# Patient Record
Sex: MALE | Race: WHITE | NOT HISPANIC OR LATINO | URBAN - METROPOLITAN AREA
[De-identification: names, ages, dates, MRNs, and addresses within clinical notes are randomized per-mention and may not be internally consistent; named-entity substitution may affect disease eponyms.]

---

## 2017-03-13 ENCOUNTER — FOLLOW UP ESTABLISHED (OUTPATIENT)
Dept: URBAN - METROPOLITAN AREA CLINIC 30 | Facility: CLINIC | Age: 72
End: 2017-03-13
Payer: COMMERCIAL

## 2017-03-13 DIAGNOSIS — H25.813 COMBINED FORMS OF AGE-RELATED CATARACT, BILATERAL: Primary | ICD-10-CM

## 2017-03-13 PROCEDURE — 92014 COMPRE OPH EXAM EST PT 1/>: CPT | Performed by: OPTOMETRIST

## 2017-03-13 PROCEDURE — 92015 DETERMINE REFRACTIVE STATE: CPT | Performed by: OPTOMETRIST

## 2017-03-13 ASSESSMENT — VISUAL ACUITY
OD: 20/20
OS: 20/50

## 2017-03-13 ASSESSMENT — INTRAOCULAR PRESSURE
OS: 15
OD: 15

## 2017-03-13 ASSESSMENT — KERATOMETRY
OS: 43.38
OD: 43.91

## 2017-05-02 ENCOUNTER — Encounter (OUTPATIENT)
Dept: URBAN - METROPOLITAN AREA CLINIC 30 | Facility: CLINIC | Age: 72
End: 2017-05-02
Payer: COMMERCIAL

## 2017-05-02 DIAGNOSIS — Z01.818 ENCOUNTER FOR OTHER PREPROCEDURAL EXAMINATION: Primary | ICD-10-CM

## 2017-05-02 PROCEDURE — 92025 CPTRIZED CORNEAL TOPOGRAPHY: CPT | Performed by: OPHTHALMOLOGY

## 2017-05-02 PROCEDURE — 99213 OFFICE O/P EST LOW 20 MIN: CPT | Performed by: PHYSICIAN ASSISTANT

## 2017-05-02 RX ORDER — AMLODIPINE BESYLATE 2.5 MG/1
2.5 MG TABLET ORAL
Qty: 0 | Refills: 0 | Status: INACTIVE
Start: 2017-05-02 | End: 2021-11-09

## 2017-05-02 RX ORDER — ASPIRIN 81 MG/1
81 MG TABLET, COATED ORAL
Qty: 0 | Refills: 0 | Status: ACTIVE
Start: 2017-05-02

## 2017-05-02 RX ORDER — VITAMIN D, TAB 1000IU (100/BT) 25 MCG
TAB ORAL
Qty: 0 | Refills: 0 | Status: INACTIVE
Start: 2017-05-02 | End: 2021-11-09

## 2017-05-02 RX ORDER — FINASTERIDE 5 MG/1
5 MG TABLET, FILM COATED ORAL
Qty: 0 | Refills: 0 | Status: INACTIVE
Start: 2017-05-02 | End: 2021-11-09

## 2017-05-02 RX ORDER — ZINC 50 MG
50 MG TABLET ORAL
Qty: 0 | Refills: 0 | Status: INACTIVE
Start: 2017-05-02 | End: 2021-11-09

## 2017-05-02 RX ORDER — BUDESONIDE AND FORMOTEROL FUMARATE DIHYDRATE 160; 4.5 UG/1; UG/1
AEROSOL RESPIRATORY (INHALATION)
Qty: 0 | Refills: 0 | Status: INACTIVE
Start: 2017-05-02 | End: 2021-11-09

## 2017-05-02 RX ORDER — TESTOSTERONE CYPIONATE 100 MG/ML
INJECTION, SOLUTION INTRAMUSCULAR
Qty: 0 | Refills: 0 | Status: INACTIVE
Start: 2017-05-02 | End: 2021-11-09

## 2017-05-02 RX ORDER — ALBUTEROL SULFATE 90 UG/1
AEROSOL, METERED RESPIRATORY (INHALATION)
Qty: 0 | Refills: 0 | Status: INACTIVE
Start: 2017-05-02 | End: 2021-11-09

## 2017-05-03 ENCOUNTER — FOLLOW UP ESTABLISHED (OUTPATIENT)
Dept: URBAN - METROPOLITAN AREA CLINIC 24 | Facility: CLINIC | Age: 72
End: 2017-05-03
Payer: COMMERCIAL

## 2017-05-03 DIAGNOSIS — H43.812 VITREOUS DEGENERATION, LEFT EYE: ICD-10-CM

## 2017-05-03 DIAGNOSIS — H25.811 COMBINED FORMS OF AGE-RELATED CATARACT, RIGHT EYE: ICD-10-CM

## 2017-05-03 DIAGNOSIS — H25.812 COMBINED FORMS OF AGE-RELATED CATARACT, LEFT EYE: Primary | ICD-10-CM

## 2017-05-03 DIAGNOSIS — H18.59 OTHER HEREDITARY CORNEAL DYSTROPHIES: ICD-10-CM

## 2017-05-03 PROCEDURE — 92012 INTRM OPH EXAM EST PATIENT: CPT | Performed by: OPHTHALMOLOGY

## 2017-05-03 ASSESSMENT — INTRAOCULAR PRESSURE
OS: 16
OD: 16

## 2017-05-10 ENCOUNTER — SURGERY (OUTPATIENT)
Dept: URBAN - METROPOLITAN AREA SURGERY 12 | Facility: SURGERY | Age: 72
End: 2017-05-10
Payer: COMMERCIAL

## 2017-05-10 PROCEDURE — 66984 XCAPSL CTRC RMVL W/O ECP: CPT | Performed by: OPHTHALMOLOGY

## 2017-05-11 ENCOUNTER — POST OP (OUTPATIENT)
Dept: URBAN - METROPOLITAN AREA CLINIC 30 | Facility: CLINIC | Age: 72
End: 2017-05-11

## 2017-05-11 PROCEDURE — 99024 POSTOP FOLLOW-UP VISIT: CPT | Performed by: OPTOMETRIST

## 2017-05-11 ASSESSMENT — INTRAOCULAR PRESSURE: OS: 15

## 2017-05-17 ENCOUNTER — POST OP (OUTPATIENT)
Dept: URBAN - METROPOLITAN AREA CLINIC 30 | Facility: CLINIC | Age: 72
End: 2017-05-17
Payer: COMMERCIAL

## 2017-05-17 DIAGNOSIS — Z96.1 PRESENCE OF INTRAOCULAR LENS: Primary | ICD-10-CM

## 2017-05-17 PROCEDURE — 99024 POSTOP FOLLOW-UP VISIT: CPT | Performed by: OPTOMETRIST

## 2017-05-17 ASSESSMENT — INTRAOCULAR PRESSURE
OS: 17
OD: 15

## 2017-05-17 ASSESSMENT — VISUAL ACUITY
OD: 20/25
OS: 20/25

## 2017-05-24 ENCOUNTER — SURGERY (OUTPATIENT)
Dept: URBAN - METROPOLITAN AREA SURGERY 12 | Facility: SURGERY | Age: 72
End: 2017-05-24
Payer: COMMERCIAL

## 2017-05-24 PROCEDURE — 66984 XCAPSL CTRC RMVL W/O ECP: CPT | Performed by: OPHTHALMOLOGY

## 2017-05-25 ENCOUNTER — POST OP (OUTPATIENT)
Dept: URBAN - METROPOLITAN AREA CLINIC 30 | Facility: CLINIC | Age: 72
End: 2017-05-25

## 2017-05-25 PROCEDURE — 99024 POSTOP FOLLOW-UP VISIT: CPT | Performed by: OPTOMETRIST

## 2017-05-25 ASSESSMENT — INTRAOCULAR PRESSURE: OD: 22

## 2017-06-20 ENCOUNTER — POST OP (OUTPATIENT)
Dept: URBAN - METROPOLITAN AREA CLINIC 30 | Facility: CLINIC | Age: 72
End: 2017-06-20
Payer: COMMERCIAL

## 2017-06-20 PROCEDURE — 99024 POSTOP FOLLOW-UP VISIT: CPT | Performed by: OPTOMETRIST

## 2017-06-20 ASSESSMENT — INTRAOCULAR PRESSURE
OD: 18
OS: 18

## 2017-06-20 ASSESSMENT — VISUAL ACUITY
OS: 20/30
OD: 20/20

## 2018-01-19 ENCOUNTER — FOLLOW UP ESTABLISHED (OUTPATIENT)
Dept: URBAN - METROPOLITAN AREA CLINIC 30 | Facility: CLINIC | Age: 73
End: 2018-01-19
Payer: COMMERCIAL

## 2018-01-19 PROCEDURE — 92014 COMPRE OPH EXAM EST PT 1/>: CPT | Performed by: OPTOMETRIST

## 2018-01-19 PROCEDURE — 92134 CPTRZ OPH DX IMG PST SGM RTA: CPT | Performed by: OPTOMETRIST

## 2018-01-19 PROCEDURE — 92015 DETERMINE REFRACTIVE STATE: CPT | Performed by: OPTOMETRIST

## 2018-01-19 ASSESSMENT — INTRAOCULAR PRESSURE
OS: 19
OD: 20

## 2018-01-19 ASSESSMENT — VISUAL ACUITY
OD: 20/25
OS: 20/30

## 2021-11-09 ENCOUNTER — OFFICE VISIT (OUTPATIENT)
Dept: URBAN - METROPOLITAN AREA CLINIC 30 | Facility: CLINIC | Age: 76
End: 2021-11-09
Payer: COMMERCIAL

## 2021-11-09 DIAGNOSIS — H26.493 OTHER SECONDARY CATARACT, BILATERAL: ICD-10-CM

## 2021-11-09 DIAGNOSIS — H43.813 VITREOUS DEGENERATION, BILATERAL: Primary | ICD-10-CM

## 2021-11-09 PROCEDURE — 92004 COMPRE OPH EXAM NEW PT 1/>: CPT | Performed by: OPTOMETRIST

## 2021-11-09 PROCEDURE — 92134 CPTRZ OPH DX IMG PST SGM RTA: CPT | Performed by: OPTOMETRIST

## 2021-11-09 RX ORDER — ALBUTEROL SULFATE 90 UG/1
AEROSOL, METERED RESPIRATORY (INHALATION)
Qty: 0 | Refills: 0 | Status: ACTIVE
Start: 2021-11-09

## 2021-11-09 RX ORDER — TESTOSTERONE CYPIONATE 100 MG/ML
INJECTION, SOLUTION INTRAMUSCULAR
Qty: 0 | Refills: 0 | Status: ACTIVE
Start: 2021-11-09

## 2021-11-09 RX ORDER — NICOTINE POLACRILEX 2 MG
50 MG GUM BUCCAL
Qty: 0 | Refills: 0 | Status: ACTIVE
Start: 2021-11-09

## 2021-11-09 RX ORDER — AMLODIPINE BESYLATE 2.5 MG/1
2.5 MG TABLET ORAL
Qty: 0 | Refills: 0 | Status: ACTIVE
Start: 2021-11-09

## 2021-11-09 RX ORDER — FINASTERIDE 5 MG/1
5 MG TABLET, FILM COATED ORAL
Qty: 0 | Refills: 0 | Status: ACTIVE
Start: 2021-11-09

## 2021-11-09 RX ORDER — BUDESONIDE AND FORMOTEROL FUMARATE DIHYDRATE 160; 4.5 UG/1; UG/1
AEROSOL RESPIRATORY (INHALATION)
Qty: 0 | Refills: 0 | Status: ACTIVE
Start: 2021-11-09

## 2021-11-09 RX ORDER — CHOLECALCIFEROL (VITAMIN D3) 25 MCG
TABLET ORAL
Qty: 0 | Refills: 0 | Status: ACTIVE
Start: 2021-11-09

## 2021-11-09 ASSESSMENT — KERATOMETRY
OS: 43.34
OD: 44.37

## 2021-11-09 ASSESSMENT — INTRAOCULAR PRESSURE
OS: 17
OD: 18

## 2021-11-09 ASSESSMENT — VISUAL ACUITY
OD: 20/30
OS: 20/60

## 2021-11-09 NOTE — IMPRESSION/PLAN
Impression: Other secondary cataract, bilateral Plan: Mild OS>OD, monitor, pt educ. Tx not indicated. Spec rx not issued today, dryness limits.

## 2021-11-09 NOTE — IMPRESSION/PLAN
Impression: Tear film insufficiency of bilateral lacrimal glands: H04.123. Plan: OS>OD, limits BCVA OS. Hold spec rx.  Start ATs TID-QID OU and recheck refraction in 3-4 weeks, doctor tried to recheck OS refraction post dilation with NI.

## 2021-11-30 ENCOUNTER — OFFICE VISIT (OUTPATIENT)
Dept: URBAN - METROPOLITAN AREA CLINIC 30 | Facility: CLINIC | Age: 76
End: 2021-11-30
Payer: COMMERCIAL

## 2021-11-30 DIAGNOSIS — H04.123 TEAR FILM INSUFFICIENCY OF BILATERAL LACRIMAL GLANDS: Primary | ICD-10-CM

## 2021-11-30 DIAGNOSIS — H52.4 PRESBYOPIA: ICD-10-CM

## 2021-11-30 PROCEDURE — 99213 OFFICE O/P EST LOW 20 MIN: CPT | Performed by: OPTOMETRIST

## 2021-11-30 RX ORDER — POLYETHYLENE GLYCOL 400 AND PROPYLENE GLYCOL 4; 3 MG/ML; MG/ML
SOLUTION/ DROPS OPHTHALMIC
Qty: 0 | Refills: 0 | Status: ACTIVE
Start: 2021-11-30

## 2021-11-30 ASSESSMENT — INTRAOCULAR PRESSURE
OS: 17
OD: 16

## 2021-11-30 ASSESSMENT — KERATOMETRY
OS: 43.55
OD: 44.20

## 2021-11-30 ASSESSMENT — VISUAL ACUITY
OD: 20/20
OS: 20/40

## 2021-11-30 NOTE — IMPRESSION/PLAN
Impression: Tear film insufficiency of bilateral lacrimal glands: H04.123. Plan: Using ATs TID OU, cannot tell a difference in how eyes feel but better clarity OS. Dryness improved but still persists OS, pt is eager to get new glasses, spec rx issued Cont ATs and call if worsens.  
rtc 1 year CE